# Patient Record
Sex: FEMALE
[De-identification: names, ages, dates, MRNs, and addresses within clinical notes are randomized per-mention and may not be internally consistent; named-entity substitution may affect disease eponyms.]

---

## 2019-01-23 PROBLEM — Z00.00 ENCOUNTER FOR PREVENTIVE HEALTH EXAMINATION: Status: ACTIVE | Noted: 2019-01-23

## 2023-02-24 ENCOUNTER — APPOINTMENT (OUTPATIENT)
Dept: NEUROSURGERY | Facility: CLINIC | Age: 68
End: 2023-02-24

## 2023-03-28 ENCOUNTER — APPOINTMENT (OUTPATIENT)
Dept: NEUROSURGERY | Facility: CLINIC | Age: 68
End: 2023-03-28
Payer: MEDICARE

## 2023-03-28 VITALS
WEIGHT: 170 LBS | HEART RATE: 97 BPM | TEMPERATURE: 96.1 F | DIASTOLIC BLOOD PRESSURE: 74 MMHG | BODY MASS INDEX: 29.02 KG/M2 | OXYGEN SATURATION: 98 % | SYSTOLIC BLOOD PRESSURE: 121 MMHG | HEIGHT: 64 IN

## 2023-03-28 DIAGNOSIS — M96.1 POSTLAMINECTOMY SYNDROME, NOT ELSEWHERE CLASSIFIED: ICD-10-CM

## 2023-03-28 PROCEDURE — 99204 OFFICE O/P NEW MOD 45 MIN: CPT

## 2023-03-28 NOTE — RESULTS/DATA
[FreeTextEntry1] : CT scan from December 2022 shows well fused L3-S1 there is a small amount of lucency at L3 screws but there is solid fusion around.  MRI reviewed which also shows well decompressed lumbar spine without adjacent level disease

## 2023-03-28 NOTE — HISTORY OF PRESENT ILLNESS
[FreeTextEntry1] : low back pain [de-identified] : This is a 67-year-old female who complains of chronic low back pain patient has a history of 2 lumbar fusions over the past 4 years with Dr. Dueñas and Dr. Casiano at [place].\par Her primary complaint prior to the surgery was back pain.  Since her surgery she did not improve after her second surgery she has been worse in regards to low back pain.  This time she denies any significant lower extremity pain numbness tingling weakness or bladder bowel dysfunction.  The pain is severe is constant worse with movement denies any relieving factors.  It causes difficulty with walking due to her pain.  Medication lists are attached.  She has tried physical therapy without relief no recent pain management procedures.  She has seen Dr. Casiano since the surgery as Dr. Palacios is retired and no surgery was offered

## 2023-03-28 NOTE — ASSESSMENT
[FreeTextEntry1] : In summary this is a 67-year-old female who is undergone 2 lumbar decompressions and fusions in the past 4 years without relief of her back pain.  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  We advised the patient to see pain management and to avoid neurosurgical intervention.  Patient was seen by myself and Dr. Carver.  All the patient's questions were answered and she was satisfied with the visit